# Patient Record
Sex: FEMALE | Race: BLACK OR AFRICAN AMERICAN | NOT HISPANIC OR LATINO
[De-identification: names, ages, dates, MRNs, and addresses within clinical notes are randomized per-mention and may not be internally consistent; named-entity substitution may affect disease eponyms.]

---

## 2019-01-07 ENCOUNTER — RESULT REVIEW (OUTPATIENT)
Age: 64
End: 2019-01-07

## 2019-08-02 ENCOUNTER — APPOINTMENT (OUTPATIENT)
Dept: ORTHOPEDIC SURGERY | Facility: CLINIC | Age: 64
End: 2019-08-02
Payer: MEDICAID

## 2019-08-02 VITALS
HEIGHT: 63 IN | BODY MASS INDEX: 28.35 KG/M2 | WEIGHT: 160 LBS | SYSTOLIC BLOOD PRESSURE: 139 MMHG | HEART RATE: 84 BPM | DIASTOLIC BLOOD PRESSURE: 87 MMHG

## 2019-08-02 DIAGNOSIS — Z80.9 FAMILY HISTORY OF MALIGNANT NEOPLASM, UNSPECIFIED: ICD-10-CM

## 2019-08-02 DIAGNOSIS — S62.615A DISPLACED FRACTURE OF PROXIMAL PHALANX OF LEFT RING FINGER, INITIAL ENCOUNTER FOR CLOSED FRACTURE: ICD-10-CM

## 2019-08-02 DIAGNOSIS — Z86.73 PERSONAL HISTORY OF TRANSIENT ISCHEMIC ATTACK (TIA), AND CEREBRAL INFARCTION W/OUT RESIDUAL DEFICITS: ICD-10-CM

## 2019-08-02 PROCEDURE — 99203 OFFICE O/P NEW LOW 30 MIN: CPT

## 2019-08-02 PROCEDURE — 73130 X-RAY EXAM OF HAND: CPT | Mod: LT

## 2019-08-02 RX ORDER — METHOCARBAMOL 500 MG/1
500 TABLET, FILM COATED ORAL
Refills: 0 | Status: ACTIVE | COMMUNITY

## 2019-08-02 RX ORDER — AMLODIPINE BESYLATE 5 MG/1
5 TABLET ORAL
Refills: 0 | Status: ACTIVE | COMMUNITY

## 2019-08-02 NOTE — HISTORY OF PRESENT ILLNESS
[Right] : right hand dominant [FreeTextEntry1] : She comes in today for evaluation of a fracture of her left ring finger pressure.  She fell 23 days ago.  She was diagnosed with a small, ring finger fracture, and she was not splinted.  She is improving.  She denies other injuries.  She has a history of a left little finger fracture when she was 8 years old.

## 2019-08-02 NOTE — DISCUSSION/SUMMARY
[FreeTextEntry1] : She has findings consistent with a 23 day old eft ring finger proximal phalanx base fracture, which is healing in good alignment.\par \par I had a discussion regarding today's visit, the diagnosis, and treatment options / recommendations.  She was instructed on gentle flexion and extension exercises, and activity modification.  She will followup in 3 weeks.\par \par The patient has agreed to this plan of management and has expressed full understanding.  All questions were fully answered to the patient's satisfaction.\par \par Over 50% of the time spent with the patient was on counseling the patient on the above diagnosis, treatment plan and prognosis.

## 2019-08-02 NOTE — PHYSICAL EXAM
[de-identified] : - Constitutional: This is a female in no obvious distress.  \par - Psych: Patient is alert and oriented to person, place and time.  Patient has a normal mood and affect.\par - Cardiovascular: Normal pulses throughout the upper extremities.  No significant varicosities are noted in the upper extremities. \par - Neuro: Strength and sensation are intact throughout the upper extremities.  Patient has normal coordination.\par - Respiratory:  Patient exhibits no evidence of shortness of breath or difficulty breathing.\par - Skin: No rashes, lesions, or other abnormalities are noted in the upper extremities.\par \par ---\par \par Examination of her left hand demonstrates mild swelling at the ring finger proximal phalanx base.  She is tender in this region.  There is some limitation of flexion and extension without rotational deformity.  There mild chronic swelling at the little finger PIP joint ulnarly.  She is neurovascularly intact distally. [de-identified] : \par AP, lateral, and oblique radiographs of her left hand, demonstrate a minimally displaced left ring finger fracture at the proximal aspect.  There is good alignment on the AP with very minimal extension on the lateral, but overall acceptable alignment.  The fracture is healing.

## 2020-03-09 ENCOUNTER — RESULT REVIEW (OUTPATIENT)
Age: 65
End: 2020-03-09

## 2022-01-07 ENCOUNTER — APPOINTMENT (OUTPATIENT)
Dept: ORTHOPEDIC SURGERY | Facility: CLINIC | Age: 67
End: 2022-01-07
Payer: MEDICARE

## 2022-01-07 VITALS
SYSTOLIC BLOOD PRESSURE: 148 MMHG | HEART RATE: 84 BPM | BODY MASS INDEX: 29.23 KG/M2 | HEIGHT: 63 IN | DIASTOLIC BLOOD PRESSURE: 88 MMHG | WEIGHT: 165 LBS

## 2022-01-07 DIAGNOSIS — S46.011A STRAIN OF MUSCLE(S) AND TENDON(S) OF THE ROTATOR CUFF OF RIGHT SHOULDER, INITIAL ENCOUNTER: ICD-10-CM

## 2022-01-07 DIAGNOSIS — M25.511 PAIN IN RIGHT SHOULDER: ICD-10-CM

## 2022-01-07 PROCEDURE — 73030 X-RAY EXAM OF SHOULDER: CPT | Mod: RT

## 2022-01-07 PROCEDURE — 99214 OFFICE O/P EST MOD 30 MIN: CPT

## 2022-01-07 RX ORDER — METHOCARBAMOL 750 MG/1
750 TABLET, FILM COATED ORAL
Qty: 30 | Refills: 0 | Status: ACTIVE | COMMUNITY
Start: 2022-01-07 | End: 1900-01-01

## 2022-01-07 NOTE — PHYSICAL EXAM
[de-identified] : Right Upper Extremity\par o Shoulder :\par ¦ Inspection/Palpation : tenderness over the greater tuberosity, no acromioclavicular joint tenderness, no tenderness anterior and posterior glenohumeral joint,no swelling, no deformities\par ¦ Range of Motion :  PASSIVE 120 ACTIVE FORWARD ELEVATION: Measured at 0 degrees, ACTIVE EXTERNAL ROTATION: Measured at 15 degrees, ACTIVE INTERNAL ROTATION: Measured at L5\par ¦ Strength : external rotation 3/5, internal rotation 5/5, supraspinatus 0/5\par ¦ Stability : no joint instability on provocative testing\par ¦ Tests/Signs : Neer (-), Staley (-)\par o Upper Arm : no tenderness, no swelling, no deformities\par o Muscle Bulk : no atrophy\par o Sensation : sensation intact to light touch\par o Skin : no skin rash or discoloration\par o Vascular Exam : no edema, no cyanosis, radial and ulnar pulses normal\par \par  [de-identified] : o RIGHT Shoulder : Grashey, Axillary and Outlet views were obtained, there are no soft tissue abnormalities, no fractures, alignment is normal, normal appearing joint spaces, normal bone density, degenerative changes at the greater tuberosity, cystic lesion at the base of the coracoid.\par \par

## 2022-01-07 NOTE — DISCUSSION/SUMMARY
[de-identified] : The underlying pathophysiology was reviewed in great detail with the patient as well as the various treatment options, including ice, analgesics, NSAIDs, Physical therapy, steroid injections.\par \par A prescription was provided for a MRI of the right shoulder to rule out rotator cuff tear\par \par Activity modifications and restrictions were discussed. I advised avoiding overhead lifting. I advised the patient to work on good posture.\par \par A home exercise sheet was given and discussed with the patient to follow. \par \par A prescription was provided for Methocarbamol. \par \par A letter for work was provided today. \par \par FU once MRI results are obtained. \par \par All questions were answered, all alternatives discussed and the patient is in complete agreement with that plan. Follow-up appointment as instructed. Any issues and the patient will call or come in sooner.

## 2022-01-07 NOTE — HISTORY OF PRESENT ILLNESS
[de-identified] : ERICH ASH is a 66 year old RHD female presenting to the office complaining of right shoulder pain. Patient's primary language is Luxembourger.  Patient reports pain began on 1/5/2022 after slipping and falling, bracing her fall with straight, fully extended arm. She went to urgent care where she had xrays but did not bring the images with her today.  The patient describes the pain as a dull aching, and occasionally sharp pain diffusely located to the right shoulder that is intermittent in nature. Her  symptoms are exacerbated with any movement of the shoulder. Patient reports the pain is waking her up at night.  Patient reports associated weakness. Denies numbness and tingling in the upper extremity. \par Patient is taking Tylenol for pain relief with mild to moderate relief in symptoms. \par

## 2022-01-12 ENCOUNTER — OUTPATIENT (OUTPATIENT)
Dept: OUTPATIENT SERVICES | Facility: HOSPITAL | Age: 67
LOS: 1 days | End: 2022-01-12
Payer: COMMERCIAL

## 2022-01-12 ENCOUNTER — RESULT REVIEW (OUTPATIENT)
Age: 67
End: 2022-01-12

## 2022-01-12 ENCOUNTER — APPOINTMENT (OUTPATIENT)
Dept: MRI IMAGING | Facility: IMAGING CENTER | Age: 67
End: 2022-01-12

## 2022-01-12 DIAGNOSIS — M25.511 PAIN IN RIGHT SHOULDER: ICD-10-CM

## 2022-01-12 PROCEDURE — 73221 MRI JOINT UPR EXTREM W/O DYE: CPT | Mod: 26,RT

## 2022-01-12 PROCEDURE — 73221 MRI JOINT UPR EXTREM W/O DYE: CPT

## 2022-01-14 ENCOUNTER — NON-APPOINTMENT (OUTPATIENT)
Age: 67
End: 2022-01-14

## 2022-01-16 ENCOUNTER — NON-APPOINTMENT (OUTPATIENT)
Age: 67
End: 2022-01-16

## 2022-01-18 ENCOUNTER — APPOINTMENT (OUTPATIENT)
Dept: ORTHOPEDIC SURGERY | Facility: CLINIC | Age: 67
End: 2022-01-18
Payer: MEDICARE

## 2022-01-18 VITALS — BODY MASS INDEX: 29.66 KG/M2 | WEIGHT: 178 LBS | HEIGHT: 65 IN

## 2022-01-18 DIAGNOSIS — M75.121 COMPLETE ROTATOR CUFF TEAR OR RUPTURE OF RIGHT SHOULDER, NOT SPECIFIED AS TRAUMATIC: ICD-10-CM

## 2022-01-18 PROCEDURE — 99214 OFFICE O/P EST MOD 30 MIN: CPT | Mod: 25

## 2022-01-18 PROCEDURE — 20610 DRAIN/INJ JOINT/BURSA W/O US: CPT | Mod: RT

## 2022-01-18 NOTE — PROCEDURE
[de-identified] : At this point I recommended a therapeutic injection and under sterile precautions an injection of 0.5 cc Kenalog, 0.5 cc Dexamethasone and 4 cc 1% lidocaine were injected into the right subacromial space without complication.\par

## 2022-01-18 NOTE — DISCUSSION/SUMMARY
[de-identified] : The underlying pathophysiology was reviewed in great detail with the patient as well as the various treatment options, including ice, analgesics, NSAIDs, Physical therapy, steroid injections.\par \par MRI of the right shoulder was reviewed and discussed in great detail today. I explained that she appears to have an acute on chronic injury due to the  atrophy seen on MRI.  \par \par  Discussed surgical intervention versus conservative management.  The risks and benefits of a right arthroscopic rotator cuff repair, SCR, InSpace, rTSR were reviewed and discussed in great detail. In addition to conservative management of physical therapy and home exercise program. Patient would like to proceed with conservative management at this time. \par \par A prescription for Physical Therapy was provided.\par \par A steroid injection was given today.\par \par  A letter for work was provided today. \par \par Activity modifications and restrictions were discussed. I advised avoiding overhead lifting. I advised the patient to work on good posture.\par \par A home exercise sheet was given and discussed with the patient to follow. \par \par A prescription was provided for Methocarbamol at her last visit. continue as needed for pain. \par \par FU 6 weeks. \par \par All questions were answered, all alternatives discussed and the patient is in complete agreement with that plan. Follow-up appointment as instructed. Any issues and the patient will call or come in sooner.

## 2022-01-18 NOTE — PHYSICAL EXAM
[de-identified] : Right Upper Extremity\par o Shoulder :\par ¦ Inspection/Palpation : tenderness over the greater tuberosity, no acromioclavicular joint tenderness, no tenderness anterior and posterior glenohumeral joint,no swelling, no deformities\par ¦ Range of Motion :  ACTIVE 120 ACTIVE FORWARD ELEVATION: Measured at 150 degrees, ACTIVE EXTERNAL ROTATION: Measured at 45 degrees, ACTIVE INTERNAL ROTATION: Measured at T12\par ¦ Strength : external rotation 3+/5, internal rotation 5/5, supraspinatus 3+/5\par ¦ Stability : no joint instability on provocative testing\par ¦ Tests/Signs : Neer (+), Staley (+)\par o Upper Arm : no tenderness, no swelling, no deformities\par o Muscle Bulk : no atrophy\par o Sensation : sensation intact to light touch\par o Skin : no skin rash or discoloration\par o Vascular Exam : no edema, no cyanosis, radial and ulnar pulses normal\par \par  [de-identified] : Patient comes to today's visit with outside imaging already performed. I reviewed the images in detail with the patient and discussed the findings as highlighted below.\par \par \par \par EXAM: MR SHOULDER RT\par \par \par PROCEDURE DATE: 01/12/2022\par \par \par \par INTERPRETATION: CLINICAL INDICATION: Right-sided shoulder pain.\par \par Multiplanar Multisequence MR of the right shoulder.\par \par Prior Studies: None.\par \par FINDINGS:\par \par ROTATOR CUFF: There is a full-thickness complete tear of the supraspinatus tendon with retraction of tendon fibers to the level of the acromioclavicular joint. There is moderate tendinosis of the remnant tendon fibers. There is full-thickness near complete tearing of the infraspinatus tendon with some of the posterior insertion appearing to remain intact. There is retraction of the infraspinatus tendon fibers to the level of the acromion. There is moderate to severe tendinosis of the remnant tendon fibers. There is moderate to severe tendinosis of the subscapularis tendon with full-thickness tearing along the cranial insertion measuring approximately 0.6 cm in craniocaudal dimension. Prominent enthesopathic change is seen throughout the lesser tuberosity.\par \par MUSCLES: There is atrophy and fatty infiltration involving the supraspinatus, infraspinatus, and subscapularis muscles. Feathery edema seen within the lateral aspect of the deltoid muscle is consistent with mild muscle strain.\par \par LONG HEAD BICEPS TENDON: There is full-thickness tearing of the intra-articular portion of the long head biceps tendon. Remnant tendon fibers are seen within the proximal extra-articular biceps tendon sheath.\par \par GLENOHUMERAL JOINT: Humeral head is high riding nearly contacting the acromion. There is subarticular edema along the posterior medial aspect of the humeral head consistent with overlying chondral wear. There is diffuse degeneration of the glenoid labrum with the posterior superior to posterior inferior labrum appearing diffusely blunted and diminutive.\par \par SYNOVIUM: There is a large glenohumeral joint effusion with fluid decompressing into the subacromial/subdeltoid bursa. Synovial and bursal debris is noted.\par \par ACROMIOCLAVICULAR JOINT: There is mild to moderate arthrosis. There is a broad-based lateral subacromial enthesophyte.\par \par MARROW: There is no acute fracture or osteonecrosis.\par \par NERVES: Intact.\par \par SUBCUTANEOUS TISSUES: Unremarkable.\par \par IMPRESSION:\par \par Full-thickness complete tear of the supraspinatus tendon with retraction of tendon fibers to the level of the acromion.\par \par Full-thickness near complete tear of the infraspinatus tendon with retraction of tendon fibers to the level of the acromion.\par \par Full-thickness tearing of the cranial insertional of the subscapularis tendon.\par \par Humeral head is high riding nearly contacting undersurface of the acromion.\par \par Subarticular edema along the posterior medial aspect of the humeral head likely related to overlying chondral wear.\par \par Large glenohumeral joint effusion with fluid decompressing into the subacromial/subdeltoid bursa. Synovial and bursal debris is noted.\par \par --- End of Report ---

## 2022-01-18 NOTE — HISTORY OF PRESENT ILLNESS
[de-identified] : ERICH ASH is a 66 year old RHD female presenting to the office complaining of right shoulder pain. Patient's primary language is Cambodian.  Patient reports pain began on 1/5/2022 after slipping and falling, bracing her fall with straight, fully extended arm. She went to urgent care where she had xrays but did not bring the images with her today.  The patient describes the pain as a dull aching, and occasionally sharp pain diffusely located to the right shoulder that is intermittent in nature. Her  symptoms have improved since the last visit. Denies numbness and tingling in the upper extremity. \par Patient is taking Tylenol for pain relief with mild to moderate relief in symptoms.  Of note, patient reports multiple falls over the years.  She is here today for MRI review of the right shoulder. \par

## 2023-09-11 ENCOUNTER — EMERGENCY (EMERGENCY)
Facility: HOSPITAL | Age: 68
LOS: 1 days | Discharge: HOME | End: 2023-09-11
Payer: COMMERCIAL

## 2023-09-11 VITALS
DIASTOLIC BLOOD PRESSURE: 100 MMHG | HEART RATE: 96 BPM | TEMPERATURE: 98 F | OXYGEN SATURATION: 97 % | HEIGHT: 65 IN | RESPIRATION RATE: 20 BRPM | WEIGHT: 167.99 LBS | SYSTOLIC BLOOD PRESSURE: 179 MMHG

## 2023-09-11 VITALS
RESPIRATION RATE: 17 BRPM | DIASTOLIC BLOOD PRESSURE: 77 MMHG | SYSTOLIC BLOOD PRESSURE: 148 MMHG | OXYGEN SATURATION: 99 % | HEART RATE: 79 BPM | TEMPERATURE: 98 F

## 2023-09-11 PROCEDURE — 73590 X-RAY EXAM OF LOWER LEG: CPT

## 2023-09-11 PROCEDURE — 82435 ASSAY OF BLOOD CHLORIDE: CPT

## 2023-09-11 PROCEDURE — 82803 BLOOD GASES ANY COMBINATION: CPT

## 2023-09-11 PROCEDURE — 83605 ASSAY OF LACTIC ACID: CPT

## 2023-09-11 PROCEDURE — 82947 ASSAY GLUCOSE BLOOD QUANT: CPT

## 2023-09-11 PROCEDURE — 99283 EMERGENCY DEPT VISIT LOW MDM: CPT | Mod: 25

## 2023-09-11 PROCEDURE — 82330 ASSAY OF CALCIUM: CPT

## 2023-09-11 PROCEDURE — 99285 EMERGENCY DEPT VISIT HI MDM: CPT

## 2023-09-11 PROCEDURE — 85014 HEMATOCRIT: CPT

## 2023-09-11 PROCEDURE — 84295 ASSAY OF SERUM SODIUM: CPT

## 2023-09-11 PROCEDURE — 85018 HEMOGLOBIN: CPT

## 2023-09-11 PROCEDURE — 73590 X-RAY EXAM OF LOWER LEG: CPT | Mod: 26,RT

## 2023-09-11 PROCEDURE — 84132 ASSAY OF SERUM POTASSIUM: CPT

## 2023-09-11 PROCEDURE — 99053 MED SERV 10PM-8AM 24 HR FAC: CPT

## 2023-09-11 RX ORDER — ACETAMINOPHEN 500 MG
975 TABLET ORAL ONCE
Refills: 0 | Status: COMPLETED | OUTPATIENT
Start: 2023-09-11 | End: 2023-09-11

## 2023-09-11 RX ADMIN — Medication 975 MILLIGRAM(S): at 08:34
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
## 2023-09-11 NOTE — ED PROVIDER NOTE - PATIENT PORTAL LINK FT
You can access the FollowMyHealth Patient Portal offered by [facility] by registering at the following website: [email or web address]. By joining [employer]’s FollowMyHealth portal, you will also be able to view your health information using other applications (apps) compatible with our system.
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
## 2023-09-11 NOTE — ED ADULT NURSE NOTE - NSFALLUNIVINTERV_ED_ALL_ED
Bed/Stretcher in lowest position, wheels locked, appropriate side rails in place/Call bell, personal items and telephone in reach/Instruct patient to call for assistance before getting out of bed/chair/stretcher/Non-slip footwear applied when patient is off stretcher/[place] to call system/Physically safe environment - no spills, clutter or unnecessary equipment/Purposeful proactive rounding/Room/bathroom lighting operational, light cord in reach
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
## 2023-09-11 NOTE — ED PROVIDER NOTE - PHYSICAL EXAMINATION
General: non-toxic, NAD  HEENT: NCAT, PERRL, no facial pain or instability.   Cardiac: RRR, no murmurs, 2+ peripheral pulses  Resp: CTAB  Abdomen: soft, non-distended, bowel sounds present, no ttp, no rebound or guarding.   MSK: no midline spinal tenderness. pelvis stable. no pain with passive int/ext rotation of bilateral hips. no chest wall tenderness. aside from mid-anterior shin, no other bony tenderness to palpation. extremities with full, painless active ROM.   Skin: ecchymosis to mid-anterior shin.   Neuro: AAOx4, 5+motor, sensation grossly intact  Psych: mood and affect appropriate

## 2023-09-11 NOTE — ED PROVIDER NOTE - ADDITIONAL NOTES AND INSTRUCTIONS:
Please excuse Chantale Herring from work on 9/11 and 9/12 2023 as she was evaluated in the Emergency Room.

## 2023-09-11 NOTE — ED PROVIDER NOTE - PROGRESS NOTE DETAILS
Latonia Granados (Rodriguez) PGY3 vbg not ordered, sent as a reflex with mildly elevated lactate. no clinical concern for infectious process or sepsis. XR without obvious fracture. reassess HA and dc with PCP follow up. Latonia Granados (Rodriguez) PGY3 vbg not ordered, sent as a reflex with mildly elevated lactate. no clinical concern for infectious process or sepsis. XR without obvious fracture. reassess HA and dc with PCP follow up. headache improved after tylenol. pt has a pcp and agrees to a follow up appointment within the week. return precautions given for red flag headache features and pt is comfortable with this plan given low risk mechanism, absence of red flags, and reassuring exam.  at bedside also in agreement with plan.
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
## 2023-09-11 NOTE — ED PROVIDER NOTE - NS ED ROS FT
Constitutional: no fevers, chills  HEENT: +HA, no vision changes, rhinorrhea, sore throat  Cardiac: no chest pain, palpitations  Respiratory: no SOB, cough or hemoptysis  GI: no n/v/d/c, abd pain, bloody or dark stools  MSK: no joint pain, neck pain or back pain +shin pain  Neuro: no numbness/tingling, weakness, unsteady gait  ROS otherwise neg except per hpi

## 2025-06-24 ENCOUNTER — EMERGENCY (EMERGENCY)
Facility: HOSPITAL | Age: 70
LOS: 1 days | End: 2025-06-24
Attending: EMERGENCY MEDICINE
Payer: COMMERCIAL

## 2025-06-24 VITALS
DIASTOLIC BLOOD PRESSURE: 78 MMHG | SYSTOLIC BLOOD PRESSURE: 187 MMHG | HEIGHT: 64 IN | WEIGHT: 175.05 LBS | HEART RATE: 98 BPM | RESPIRATION RATE: 20 BRPM | TEMPERATURE: 98 F | OXYGEN SATURATION: 98 %

## 2025-06-24 PROBLEM — I10 ESSENTIAL (PRIMARY) HYPERTENSION: Chronic | Status: ACTIVE | Noted: 2023-09-11

## 2025-06-24 PROCEDURE — 71046 X-RAY EXAM CHEST 2 VIEWS: CPT | Mod: 26

## 2025-06-24 PROCEDURE — 99284 EMERGENCY DEPT VISIT MOD MDM: CPT

## 2025-06-24 PROCEDURE — 73110 X-RAY EXAM OF WRIST: CPT

## 2025-06-24 PROCEDURE — 71100 X-RAY EXAM RIBS UNI 2 VIEWS: CPT

## 2025-06-24 PROCEDURE — 73130 X-RAY EXAM OF HAND: CPT

## 2025-06-24 PROCEDURE — 73110 X-RAY EXAM OF WRIST: CPT | Mod: 26,RT

## 2025-06-24 PROCEDURE — 99284 EMERGENCY DEPT VISIT MOD MDM: CPT | Mod: 25

## 2025-06-24 PROCEDURE — 71046 X-RAY EXAM CHEST 2 VIEWS: CPT

## 2025-06-24 PROCEDURE — 71100 X-RAY EXAM RIBS UNI 2 VIEWS: CPT | Mod: 26

## 2025-06-24 PROCEDURE — 73130 X-RAY EXAM OF HAND: CPT | Mod: 26,RT

## 2025-06-24 RX ORDER — LIDOCAINE HYDROCHLORIDE 20 MG/ML
1 JELLY TOPICAL ONCE
Refills: 0 | Status: COMPLETED | OUTPATIENT
Start: 2025-06-24 | End: 2025-06-24

## 2025-06-24 RX ORDER — ACETAMINOPHEN 500 MG/5ML
975 LIQUID (ML) ORAL ONCE
Refills: 0 | Status: COMPLETED | OUTPATIENT
Start: 2025-06-24 | End: 2025-06-24

## 2025-06-24 RX ADMIN — Medication 975 MILLIGRAM(S): at 06:56

## 2025-06-24 RX ADMIN — LIDOCAINE HYDROCHLORIDE 1 PATCH: 20 JELLY TOPICAL at 06:57

## 2025-06-24 RX ADMIN — Medication 975 MILLIGRAM(S): at 07:33
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
## 2025-06-24 NOTE — ED PROVIDER NOTE - PHYSICAL EXAMINATION
Exam:   General: Non toxic, well appearing  HENT: No pharyngeal erythema/exudate, external ear exam normal  Eyes: PEERL, EOMI  Lungs: CTA B/L, no wheezing, no rales, no ronchi  Chest: Normal Heart sounds, no murmurs, no rubs no gallops, pos r chest ttp   Abdomen: Soft, no tenderness, no rigidity, no guarding, neg Rovsing's signs, neg tenderness at Mckburney's point  MSK: FROM of joints, pos swelling r hand on ulnar side, ttp 4/5 metacarpal   Skin: No new rashes or lesions  Neuro: Alert and oriented x 3, power 5/5 x 4,

## 2025-06-24 NOTE — ED PROVIDER NOTE - PATIENT PORTAL LINK FT
You can access the FollowMyHealth Patient Portal offered by [facility] by registering at the following website: [email or web address]. By joining [employer]’s FollowMyHealth portal, you will also be able to view your health information using other applications (apps) compatible with our system.

## 2025-06-24 NOTE — ED PROVIDER NOTE - NSFOLLOWUPCLINICS_GEN_ALL_ED_FT
[facility]  Orthopedic Surgery  [address], 3rd & [address]th [place] [address]  Phone: [phone]  Fax:   Follow Up Time: 4-6 Days

## 2025-06-24 NOTE — ED PROVIDER NOTE - PROGRESS NOTE DETAILS
Ashleigh Leonard MD ([email or web address]) Patient signed out to me.  53-year-old female fell onto her right side.  X-ray shows evidence of an acute mildly displaced fracture at the base of the fifth metacarpal.  Will splint and DC patient with outpatient follow-up.  Patient pulling 1500 cc on incentive spirometry. Ashleigh Leonard MD ([email or web address]) Patient signed out to me.  53-year-old female fell onto her right side.  X-ray shows evidence of an acute mildly displaced fracture at the base of the fifth metacarpal.  Will reduce, splint and DC patient with outpatient follow-up.  Patient pulling 1500 cc on incentive spirometry.

## 2025-06-24 NOTE — ED ADULT NURSE NOTE - NSFALLRISKINTERV_ED_ALL_ED

## 2025-06-24 NOTE — ED ADULT NURSE NOTE - OBJECTIVE STATEMENT
70 y/o F, pmh HTN, presents to the ED for s/p fall 2 days ago, endorses R wrist pain and R rib pain. pt states she fell onto her R side, denies LOC, AC, head strike. denies cp, sob, dizziness, fever, chills, weakness. pt has been walking since the fall. R wrist is swollen and red, compared to L wrist. pt did not take pain meds today. pt is A&Ox4 ,speech is clear, following commands.